# Patient Record
Sex: MALE | Race: ASIAN | NOT HISPANIC OR LATINO | URBAN - METROPOLITAN AREA
[De-identification: names, ages, dates, MRNs, and addresses within clinical notes are randomized per-mention and may not be internally consistent; named-entity substitution may affect disease eponyms.]

---

## 2018-09-23 ENCOUNTER — EMERGENCY (EMERGENCY)
Facility: HOSPITAL | Age: 48
LOS: 1 days | Discharge: HOME | End: 2018-09-23
Attending: EMERGENCY MEDICINE | Admitting: EMERGENCY MEDICINE
Payer: SELF-PAY

## 2018-09-23 VITALS
TEMPERATURE: 94 F | SYSTOLIC BLOOD PRESSURE: 148 MMHG | OXYGEN SATURATION: 100 % | RESPIRATION RATE: 20 BRPM | DIASTOLIC BLOOD PRESSURE: 106 MMHG | HEART RATE: 93 BPM

## 2018-09-23 PROCEDURE — 99283 EMERGENCY DEPT VISIT LOW MDM: CPT | Mod: 25

## 2018-09-23 PROCEDURE — 99053 MED SERV 10PM-8AM 24 HR FAC: CPT

## 2018-09-23 NOTE — ED ADULT TRIAGE NOTE - CHIEF COMPLAINT QUOTE
Pt. was found lying in street. Pt. is severely  intoxicated. Opens eyes to tactile stimuli, otherwise is asleep. Will need a 1:1 observation when awake.

## 2018-09-24 VITALS
RESPIRATION RATE: 20 BRPM | SYSTOLIC BLOOD PRESSURE: 131 MMHG | HEART RATE: 88 BPM | DIASTOLIC BLOOD PRESSURE: 88 MMHG | OXYGEN SATURATION: 100 %

## 2018-09-24 NOTE — ED ADULT NURSE NOTE - NSIMPLEMENTINTERV_GEN_ALL_ED
Implemented All Fall Risk Interventions:  [place] to call system. Call bell, personal items and telephone within reach. Instruct patient to call for assistance. Room bathroom lighting operational. Non-slip footwear when patient is off stretcher. Physically safe environment: no spills, clutter or unnecessary equipment. Stretcher in lowest position, wheels locked, appropriate side rails in place. Provide visual cue, wrist band, yellow gown, etc. Monitor gait and stability. Monitor for mental status changes and reorient to person, place, and time. Review medications for side effects contributing to fall risk. Reinforce activity limits and safety measures with patient and family.

## 2018-09-24 NOTE — ED ADULT NURSE REASSESSMENT NOTE - NS ED NURSE REASSESS COMMENT FT1
Pt dc by PA.  Pt expressed understanding of DC instructions.  belongings returned, Ambulated to exit.

## 2018-09-24 NOTE — ED ADULT NURSE REASSESSMENT NOTE - NS ED NURSE REASSESS COMMENT FT1
Pt awake and agitated.  Pt yelling profanities at staff.  Pt soaked in urine.  pt not allowing staff to change bedding or clothing.  pt able to be verbally deescalated at this time.  Pt refusing temperature at this time.

## 2018-09-24 NOTE — ED ADULT NURSE NOTE - OBJECTIVE STATEMENT
46 y/o male received in spot [address].  Pt BIBA for ETOH use.  Found in street.  Pt is arousable but not cooperative.  unknown medical hx

## 2018-09-24 NOTE — ED PROVIDER NOTE - PROGRESS NOTE DETAILS
Sleeping but arousable. Still pending sobriety. LUIS A Bermudez: pt doing well, ate breakfast, walked to bathroom and back, feels well. SW contacted for [employer] for d/c and pt agrees with plan. Educated on drinking and risks. Pt declines rehab or intervention at this time. Will fu PCP. D/c papers in chart.

## 2018-09-24 NOTE — ED PROVIDER NOTE - OBJECTIVE STATEMENT
48 yo M with unknown Past Medical History that was BIBEMS found down in street admits to drinking alcohol tonight, unknown amount, denies any other ingestions. In ED, arousable but not cooperative with HPI.

## 2018-09-24 NOTE — ED PROVIDER NOTE - MEDICAL DECISION MAKING DETAILS
48 yo M with unknown Past Medical History that was BIBEMS for found outside sleeping on street with alcohol intake/intox. Pt arousable but sleeping, not answering questions. physical exam shows pt has alcohol on breath, no signs trauma, eyes PERRL, EOMI. Will hold imaging at this time. Wait for sobriety.